# Patient Record
Sex: FEMALE | Race: WHITE
[De-identification: names, ages, dates, MRNs, and addresses within clinical notes are randomized per-mention and may not be internally consistent; named-entity substitution may affect disease eponyms.]

---

## 2023-03-10 PROBLEM — Z00.00 ENCOUNTER FOR PREVENTIVE HEALTH EXAMINATION: Status: ACTIVE | Noted: 2023-03-10

## 2023-03-21 PROBLEM — Z78.9 QUIT DRUG USE IN REMOTE PAST: Status: ACTIVE | Noted: 2023-03-21

## 2023-03-21 PROBLEM — Z87.898 FORMER CONSUMPTION OF ALCOHOL: Status: ACTIVE | Noted: 2023-03-21

## 2023-03-21 PROBLEM — Z86.59 HISTORY OF SCHIZOPHRENIA: Status: RESOLVED | Noted: 2023-03-21 | Resolved: 2023-03-21

## 2023-03-21 PROBLEM — Z87.09 HISTORY OF CHRONIC OBSTRUCTIVE LUNG DISEASE: Status: RESOLVED | Noted: 2023-03-21 | Resolved: 2023-03-21

## 2023-03-21 PROBLEM — F17.200 CURRENT EVERY DAY SMOKER: Status: ACTIVE | Noted: 2023-03-21

## 2023-03-21 PROBLEM — Z86.59 HISTORY OF DEPRESSION: Status: RESOLVED | Noted: 2023-03-21 | Resolved: 2023-03-21

## 2023-03-21 PROBLEM — Z86.79 HISTORY OF HYPERTENSION: Status: RESOLVED | Noted: 2023-03-21 | Resolved: 2023-03-21

## 2023-03-21 PROBLEM — Z86.39 HISTORY OF OBESITY: Status: RESOLVED | Noted: 2023-03-21 | Resolved: 2023-03-21

## 2023-03-21 PROBLEM — Z80.1 FAMILY HISTORY OF LUNG CANCER: Status: ACTIVE | Noted: 2023-03-21

## 2023-03-22 ENCOUNTER — APPOINTMENT (OUTPATIENT)
Dept: SPINE | Facility: CLINIC | Age: 53
End: 2023-03-22
Payer: MEDICAID

## 2023-03-22 ENCOUNTER — APPOINTMENT (OUTPATIENT)
Dept: SPINE | Facility: CLINIC | Age: 53
End: 2023-03-22

## 2023-03-22 ENCOUNTER — APPOINTMENT (OUTPATIENT)
Dept: NEUROSURGERY | Facility: CLINIC | Age: 53
End: 2023-03-22

## 2023-03-22 DIAGNOSIS — Z87.09 PERSONAL HISTORY OF OTHER DISEASES OF THE RESPIRATORY SYSTEM: ICD-10-CM

## 2023-03-22 DIAGNOSIS — Z86.39 PERSONAL HISTORY OF OTHER ENDOCRINE, NUTRITIONAL AND METABOLIC DISEASE: ICD-10-CM

## 2023-03-22 DIAGNOSIS — Z78.9 OTHER SPECIFIED HEALTH STATUS: ICD-10-CM

## 2023-03-22 DIAGNOSIS — Z86.79 PERSONAL HISTORY OF OTHER DISEASES OF THE CIRCULATORY SYSTEM: ICD-10-CM

## 2023-03-22 DIAGNOSIS — Z86.59 PERSONAL HISTORY OF OTHER MENTAL AND BEHAVIORAL DISORDERS: ICD-10-CM

## 2023-03-22 DIAGNOSIS — F17.200 NICOTINE DEPENDENCE, UNSPECIFIED, UNCOMPLICATED: ICD-10-CM

## 2023-03-22 DIAGNOSIS — Z80.1 FAMILY HISTORY OF MALIGNANT NEOPLASM OF TRACHEA, BRONCHUS AND LUNG: ICD-10-CM

## 2023-03-22 DIAGNOSIS — Z87.898 PERSONAL HISTORY OF OTHER SPECIFIED CONDITIONS: ICD-10-CM

## 2023-04-05 ENCOUNTER — APPOINTMENT (OUTPATIENT)
Dept: SPINE | Facility: CLINIC | Age: 53
End: 2023-04-05
Payer: MEDICAID

## 2023-04-05 ENCOUNTER — NON-APPOINTMENT (OUTPATIENT)
Age: 53
End: 2023-04-05

## 2023-04-05 VITALS
RESPIRATION RATE: 18 BRPM | WEIGHT: 190 LBS | BODY MASS INDEX: 43.97 KG/M2 | HEART RATE: 76 BPM | OXYGEN SATURATION: 98 % | SYSTOLIC BLOOD PRESSURE: 144 MMHG | HEIGHT: 55 IN | DIASTOLIC BLOOD PRESSURE: 87 MMHG

## 2023-04-05 DIAGNOSIS — R26.9 UNSPECIFIED ABNORMALITIES OF GAIT AND MOBILITY: ICD-10-CM

## 2023-04-05 DIAGNOSIS — M54.50 LOW BACK PAIN, UNSPECIFIED: ICD-10-CM

## 2023-04-05 DIAGNOSIS — M54.16 RADICULOPATHY, LUMBAR REGION: ICD-10-CM

## 2023-04-05 DIAGNOSIS — Z86.39 PERSONAL HISTORY OF OTHER ENDOCRINE, NUTRITIONAL AND METABOLIC DISEASE: ICD-10-CM

## 2023-04-05 PROCEDURE — 99204 OFFICE O/P NEW MOD 45 MIN: CPT

## 2023-04-05 RX ORDER — TIZANIDINE 4 MG/1
4 TABLET ORAL
Refills: 0 | Status: ACTIVE | COMMUNITY

## 2023-04-05 RX ORDER — TRAZODONE HYDROCHLORIDE 150 MG/1
150 TABLET ORAL
Refills: 0 | Status: ACTIVE | COMMUNITY

## 2023-04-05 RX ORDER — NIFEDIPINE 20 MG/1
CAPSULE ORAL
Refills: 0 | Status: ACTIVE | COMMUNITY

## 2023-04-05 RX ORDER — HALOPERIDOL DECANOATE 100 MG/ML
100 INJECTION INTRAMUSCULAR
Refills: 0 | Status: ACTIVE | COMMUNITY

## 2023-04-05 RX ORDER — ATORVASTATIN CALCIUM 80 MG/1
TABLET, FILM COATED ORAL
Refills: 0 | Status: ACTIVE | COMMUNITY

## 2023-04-05 NOTE — PLAN
[FreeTextEntry1] : \par Dr. Sanchez reviewed imaging results with patient in terms she was able to comprehend with time allowed for questions NO surgical intervention was discussed with the patient  at this time images favor degenerative changes w/o surgical pathology.\par \par NO obvious signs of structural or compressive lesions that need surgical intervention or could be directly related to patients symptoms.\par \par Patient counselled regarding  worsening  or new symptoms and if these should occur , patient is aware  to contact the office for reevaluation,\par \par \par Patient is in agreement with the plan that was set forth\par \par In lieu of gait disturbance will complete further imaging workup to include:\par 1.MRI cervical spine w/o contrast\par 2.MRI thoracic spine w/o contrast\par \par Will refer to pain management for evaluation and probable DENIS

## 2023-04-05 NOTE — REVIEW OF SYSTEMS
[Tingling] : tingling [Difficulty Walking] : difficulty walking [Depression] : depression [Joint Pain] : joint pain [Joint Stiffness] : joint stiffness [Limb Pain] : limb pain [Negative] : Endocrine

## 2023-04-05 NOTE — DATA REVIEWED
[de-identified] : Patient: MAXIIMLIANO VERA\par YOB: 1970\par Phone: [phone]\par MRN: [id number] Acc: [id number]\par Date of Exam: 05-\par Exam requested by:\par STEF VILCHIS MD\par [id number]\par [facility] [id number]\par EXAM:  MRI LUMBAR SPINE WITHOUT CONTRAST\par \par HISTORY: Lower back pain.  Bilateral leg pain\par \par TECHNIQUE: Multiplanar, multi-sequential MRI of the lumbar spine was obtained on a 3T scanner using a standard protocol.\par \par COMPARISON:  Radiographs 4/25/2022\par \par FINDINGS:\par \par For purposes of this dictation, the last well-formed disc space will be labeled L5-S1.\par \par OSSEOUS STRUCTURES: Vertebral body heights are preserved. No marrow edema or destructive marrow infiltrative process.\par \par ALIGNMENT: Normal lumbar lordosis is preserved. No significant scoliosis. There is grade 1 anterolisthesis of L2 on L3. No spondylolysis within the limitations of MRI.\par \par SPINAL CORD, CONUS MEDULLARIS AND SPINAL CANAL: Unremarkable.\par \par PARASPINAL AND INTRA-ABDOMINAL SOFT TISSUES: Unremarkable.\par \par INCLUDED THORACIC SPINE AND SACRUM: Unremarkable.\par \par DISCS: There is mild disc desiccation at the L4-S1 levels. The remaining discs are normal in height and signal.\par \par The following axial levels are imaged and detailed below:\par \par L1-L2: No disc bulging or herniation. No spinal canal or neuroforaminal stenosis.\par \par L2-L3: There is uncovering of the disc with superimposed disc bulge eccentric to the right with superimposed right foraminal zone annular fissure and disc protrusion as well as moderate facet joint arthrosis. There is mild thecal sac compression and right neural foraminal stenosis. No left neural foraminal stenosis.\par \par L3-L4: There is a disc bulge and moderate facet joint arthrosis. There is mild thecal sac compression and bilateral neural foraminal stenosis.\par \par L4-L5: There is a disc bulge and moderate facet joint arthrosis. There is mild thecal sac compression and neural foraminal stenosis.\par \par L5-S1: There is a disc bulge with moderate facet joint arthrosis right greater than left. There is severe right and moderate left neural foraminal stenosis with impingement of exiting right L5 nerve roots, right greater than left. There is minimal thecal sac compression\par \par IMPRESSION:  \par *   Grade 1 anterolisthesis of L2 on L3 with superimposed degenerative change resulting in mild thecal sac compression and right neural foraminal stenosis. \par *   L3-L4 level mild thecal sac compression and bilateral neural foraminal stenosis.\par *   L4-L5 level mild thecal sac compression and neural foraminal stenosis.\par *   L5-S1 level severe right and moderate left neural foraminal stenosis with impingement of exiting right L5 nerve roots, right greater than left\par \par Thank you for the opportunity to participate in the care of this patient.  \par  \par NIMO WREN MD\par Electronically Signed: 05- 4:16 PM\par \par Printed: 03- 1:44 PM\par MAXIMILIANO VERA (Exam: 05- 8:30 AM)\par  [de-identified] : Patient: MAXIMILIANO VERA\par YOB: 1970\par Phone: [phone]\par MRN: [id number] Acc: [id number]\par Date of Exam: 04-\par Exam requested by:\par STEF VILCHIS MD\par [id number] [facility]\par [facility] [address]\par EXAM:  X-RAY LUMBAR SPINE 2 OR 3 VIEWS\par \par HISTORY:  Pain. \par \par TECHNIQUE: 3 views.\par \par COMPARISON:  None.\par \par IMPRESSION:\par \par Mild right convexity thoracolumbar scoliosis and a right pelvic inferior tilt.\par \par Minimal spondylosis.\par \par Otherwise, normal spine: Intact vertebrae, AP alignment, sacrum, disc spaces, lordosis, and grossly intact SI joints. Intact medial hip joint spaces. RUQ surgical clips.\par \par Thank you for the opportunity to participate in the care of this patient.  \par  \par LESLIE A [place] MD\par Electronically Signed: 04- 3:24 PM

## 2023-04-05 NOTE — HISTORY OF PRESENT ILLNESS
[de-identified] : This is a 52 y/o FEMALE with a h/o HTN, HLD, depression, schizophrenia (2001) who comes to be evaluated for "my lower back"\par Reports having lower back problems for ~ two (2) years denies any known provoking events\par \par The pain is in the lower back with radiation to the RIGHT buttock and thigh which led to the use of a cane for stability. \par Pain worse with bending, lifting and affects almost all activity\par \par Reports the pain persists despite multimodal conservative measures to include\par 1. supervised physical therapy which helped initially but then symptoms return\par 2.OTC NSAIDs\par 3.muscle relaxers\par 4.ESIs (doesn’t recall when the last one was)\par \par Denies bowel/bladder dysfunction/extremity weakness\par Endorses intermittent leg tingling and balance problems where "I trip over my foot" denies recent falls/injuries

## 2023-04-05 NOTE — PHYSICAL EXAM
[General Appearance - Alert] : alert [General Appearance - Well Nourished] : well nourished [General Appearance - Well-Appearing] : healthy appearing [Oriented To Time, Place, And Person] : oriented to person, place, and time [Person] : oriented to person [Place] : oriented to place [Time] : oriented to time [5] : S1 toe walking 5/5 [Limited Balance] : the patient's balance was impaired [3+] : Ankle jerk right 3+ [No Visual Abnormalities] : no visible abnormailities [Straight-Leg Raise Test - Right] : straight leg raise of the right leg was positive [Antalgic] : antalgic [Able to toe walk] : the patient was able to toe walk [Able to heel walk] : the patient was able to heel walk [Sclera] : the sclera and conjunctiva were normal [Outer Ear] : the ears and nose were normal in appearance [Neck Appearance] : the appearance of the neck was normal [] : no respiratory distress [Exaggerated Use Of Accessory Muscles For Inspiration] : no accessory muscle use [No Spinal Tenderness] : no spinal tenderness [Antalgic Gait Right] : antalgic on the right [Skin Color & Pigmentation] : normal skin color and pigmentation